# Patient Record
Sex: MALE | Race: WHITE | NOT HISPANIC OR LATINO | Employment: FULL TIME | URBAN - METROPOLITAN AREA
[De-identification: names, ages, dates, MRNs, and addresses within clinical notes are randomized per-mention and may not be internally consistent; named-entity substitution may affect disease eponyms.]

---

## 2020-06-09 ENCOUNTER — TELEPHONE (OUTPATIENT)
Dept: SCHEDULING | Facility: IMAGING CENTER | Age: 55
End: 2020-06-09

## 2020-06-12 ENCOUNTER — TELEMEDICINE (OUTPATIENT)
Dept: MEDICAL GROUP | Facility: IMAGING CENTER | Age: 55
End: 2020-06-12
Payer: COMMERCIAL

## 2020-06-12 VITALS
WEIGHT: 197.5 LBS | DIASTOLIC BLOOD PRESSURE: 98 MMHG | SYSTOLIC BLOOD PRESSURE: 156 MMHG | HEART RATE: 49 BPM | HEIGHT: 69 IN | TEMPERATURE: 98.3 F | BODY MASS INDEX: 29.25 KG/M2

## 2020-06-12 DIAGNOSIS — E03.9 HYPOTHYROIDISM, UNSPECIFIED TYPE: ICD-10-CM

## 2020-06-12 DIAGNOSIS — I10 ESSENTIAL HYPERTENSION: ICD-10-CM

## 2020-06-12 PROCEDURE — 99203 OFFICE O/P NEW LOW 30 MIN: CPT | Performed by: FAMILY MEDICINE

## 2020-06-12 RX ORDER — HYDROCHLOROTHIAZIDE 25 MG/1
25 TABLET ORAL DAILY
Qty: 30 TAB | Refills: 0 | Status: SHIPPED | OUTPATIENT
Start: 2020-06-12 | End: 2020-07-08

## 2020-06-12 RX ORDER — LEVOTHYROXINE SODIUM 0.15 MG/1
150 TABLET ORAL
Qty: 60 TAB | Refills: 0 | Status: SHIPPED | OUTPATIENT
Start: 2020-06-12 | End: 2020-08-14

## 2020-06-12 RX ORDER — LEVOTHYROXINE SODIUM 0.12 MG/1
125 TABLET ORAL
COMMUNITY
Start: 1995-12-20 | End: 2020-06-12

## 2020-06-12 SDOH — HEALTH STABILITY: MENTAL HEALTH: HOW MANY STANDARD DRINKS CONTAINING ALCOHOL DO YOU HAVE ON A TYPICAL DAY?: 5 OR 6

## 2020-06-12 SDOH — HEALTH STABILITY: MENTAL HEALTH: HOW OFTEN DO YOU HAVE A DRINK CONTAINING ALCOHOL?: 4 OR MORE TIMES A WEEK

## 2020-06-12 ASSESSMENT — PATIENT HEALTH QUESTIONNAIRE - PHQ9: CLINICAL INTERPRETATION OF PHQ2 SCORE: 0

## 2020-06-12 NOTE — PROGRESS NOTES
Telemedicine Visit: New Patient     This encounter was conducted via Zoom .   Verbal consent was obtained. Patient's identity was verified. Patient aware this will be   billed the same as an in person evaluation.    Subjective:     Chief Complaint   Patient presents with   • Establish Care   • Hypertension   • Thyroid Problem     Mitch Leigh is a 54 y.o. male with history of essential hyperteion on virtual visit to discuss blood pressure, hypothyroidism, and shortness of breath.  pcp in [place] with [facility] last visit 12/2019    He has been continuously working with his prior PCP to get normal TSH levels.  They are still elevated as of February 2020 at 5.7.  Patient still reports dry skin changes in his hair.  Otherwise he feels well.    Patient reports that he has been having longstanding history of high blood pressure.  It has been as high as 180/100 or more.  Though most of the time it is between 130-150/.  Does from time to time going higher.    Reports sob when he moved to [place] though it did go away. When putting socks on he feels it where he just has to take a couple extra breaths. Not dizzy or faint.   And intensity when working. Reports when he checks his bp it is quite high though when.  Denies sudden vision changes, headaches, shortness of breath chest pain palpitations.    Patient also reports shortness of breath since he moved to [place] just most a year ago.  Ports that the initial shortness of breath improved after couple months a living year.  Though he from time to time still get shortness of breath for example when he bends over to tie his shoes.  Wise he feels well and is active.  He does not have any associated symptoms of dizziness lightheadedness cold sweats chest pains.  Just a couple deep breaths resolves his symptoms.    Long h/o gross hematuria with full workup with urology.      ROS  See HPI  Constitutional: Negative for fever, chills and malaise/fatigue.   HENT: Negative for  "congestion, itchy watery eyes  Eyes: Negative for pain or sudden changes in vision  Respiratory: Negative for cough and wheeze  Cardiovascular: Negative for leg swelling or chest pain  Gastrointestinal: Negative for nausea, vomiting, abdominal pain and diarrhea.   Genitourinary: Negative for dysuria and with chronic gross hematuria.   Skin: Negative for rash or concerning moles   Neurological: Negative for dizziness, focal weakness   Psychiatric/Behavioral: Negative for depression.  With mild anxiety  Musculoskeletal: no weakness or joint stiffness    No Known Allergies    Current medicines (including changes today)  Current Outpatient Medications   Medication Sig Dispense Refill   • levothyroxine (SYNTHROID) 150 MCG Tab Take 1 Tab by mouth Every morning on an empty stomach. 60 Tab 0   • hydroCHLOROthiazide (HYDRODIURIL) 25 MG Tab Take 1 Tab by mouth every day. 30 Tab 0     No current facility-administered medications for this visit.        He  has a past medical history of Gross hematuria, Hypertension, and Thyroid disease.  He  has a past surgical history that includes tonsillectomy and open reduction ([id number]).      Family History   Problem Relation Age of Onset   • Cancer Father         prostate (removed), melanoma (resolved)     No family status information on file.       There are no active problems to display for this patient.         Objective:   Vitals obtained by patient:  /98   Pulse (!) 49   Temp 36.8 °C (98.3 °F)   Ht 1.753 m (5' 9\")   Wt 89.6 kg (197 lb 8 oz)   BMI 29.17 kg/m²     Physical Exam:  Constitutional: Alert, no distress, well-groomed.  Skin: No rashes in visible areas.  Eye: Round. Conjunctiva clear, lids normal. No icterus.   ENMT: Lips pink without lesions, good dentition, moist mucous membranes. Phonation normal.  Neck: No masses, no thyromegaly. Moves freely without pain.  CV: Pulse as reported by patient  Respiratory: Unlabored respiratory effort, no cough or audible " wheeze  Psych: Alert and oriented x3, normal affect and mood.   Neuro: symmetric face. Alert and oriented. Follows commands. No aphasia or dysarthria.    Labs:  No results found for any previous visit.       Imaging:   No results found.    Assessment and Plan:   The following treatment plan was discussed:   Always review package inserts for specific medications discussed how to take Synthroid alone on an empty stomach.  Patient to get TSH and free T4 in 8 weeks.  To return to office in 2 weeks for blood pressure check BMP at that time.  Problem List Items Addressed This Visit     None      Visit Diagnoses     Essential hypertension        Relevant Medications    hydroCHLOROthiazide (HYDRODIURIL) 25 MG Tab    Hypothyroidism, unspecified type        Relevant Medications    levothyroxine (SYNTHROID) 150 MCG Tab    Other Relevant Orders    TSH    FREE THYROXINE        Follow-up: Return in about 2 weeks (around 6/26/2020).        Portions of this note may be dictated using Dragon Naturally[employer] voice recognition software.  Variances in spelling and vocabulary are possible and unintentional.  Not all areas may be caught/corrected.  Please notify me if any discrepancies are noted or if the meaning of any statement is not correct/clear.

## 2020-07-08 ENCOUNTER — TELEPHONE (OUTPATIENT)
Dept: MEDICAL GROUP | Facility: IMAGING CENTER | Age: 55
End: 2020-07-08

## 2020-07-08 DIAGNOSIS — I10 ESSENTIAL HYPERTENSION: ICD-10-CM

## 2020-07-08 RX ORDER — HYDROCHLOROTHIAZIDE 25 MG/1
TABLET ORAL
Qty: 30 TAB | Refills: 0 | Status: SHIPPED | OUTPATIENT
Start: 2020-07-08 | End: 2020-08-17 | Stop reason: SDUPTHER

## 2020-07-10 NOTE — TELEPHONE ENCOUNTER
Called pt to schedule a BP follow up per Dr. Moscoso. Pt refused to schedule until 9 weeks from last visit in June. Pt refused to schedule an appointment sooner to get his labs prior to that follow up. Stated he has kept a log of all the times hes tracked his BP and will send that over [employer] to Dr. Moscoso

## 2020-08-12 ENCOUNTER — HOSPITAL ENCOUNTER (OUTPATIENT)
Dept: LAB | Facility: MEDICAL CENTER | Age: 55
End: 2020-08-12
Attending: FAMILY MEDICINE
Payer: COMMERCIAL

## 2020-08-12 ENCOUNTER — TELEPHONE (OUTPATIENT)
Dept: MEDICAL GROUP | Facility: IMAGING CENTER | Age: 55
End: 2020-08-12

## 2020-08-12 DIAGNOSIS — E03.9 HYPOTHYROIDISM, UNSPECIFIED TYPE: ICD-10-CM

## 2020-08-12 LAB
T4 FREE SERPL-MCNC: 1.54 NG/DL (ref 0.93–1.7)
TSH SERPL DL<=0.005 MIU/L-ACNC: 0.23 UIU/ML (ref 0.38–5.33)

## 2020-08-12 PROCEDURE — 84443 ASSAY THYROID STIM HORMONE: CPT

## 2020-08-12 PROCEDURE — 84439 ASSAY OF FREE THYROXINE: CPT

## 2020-08-12 PROCEDURE — 36415 COLL VENOUS BLD VENIPUNCTURE: CPT

## 2020-08-12 NOTE — TELEPHONE ENCOUNTER
----- Message from Dinora Moscoso M.D. sent at 8/12/2020 12:32 PM PDT -----  Please have patient schedule virtual apt with me. I did review his labs for bp just fyi in his message. Also I need to make adjustments to his thyroid medication. ty

## 2020-08-14 ENCOUNTER — TELEMEDICINE (OUTPATIENT)
Dept: MEDICAL GROUP | Facility: IMAGING CENTER | Age: 55
End: 2020-08-14
Payer: COMMERCIAL

## 2020-08-14 VITALS — BODY MASS INDEX: 29.18 KG/M2 | WEIGHT: 197 LBS | HEIGHT: 69 IN

## 2020-08-14 DIAGNOSIS — E03.9 HYPOTHYROIDISM, UNSPECIFIED TYPE: ICD-10-CM

## 2020-08-14 DIAGNOSIS — M25.512 ACUTE PAIN OF LEFT SHOULDER: ICD-10-CM

## 2020-08-14 DIAGNOSIS — I10 ESSENTIAL HYPERTENSION: ICD-10-CM

## 2020-08-14 PROCEDURE — 99214 OFFICE O/P EST MOD 30 MIN: CPT | Mod: 95,CR | Performed by: FAMILY MEDICINE

## 2020-08-14 RX ORDER — SILDENAFIL 50 MG/1
50 TABLET, FILM COATED ORAL
COMMUNITY

## 2020-08-14 RX ORDER — LEVOTHYROXINE SODIUM 0.1 MG/1
100 TABLET ORAL
COMMUNITY
End: 2020-08-14

## 2020-08-14 RX ORDER — LEVOTHYROXINE SODIUM 0.12 MG/1
125 TABLET ORAL
Qty: 90 TAB | Refills: 0 | Status: SHIPPED | OUTPATIENT
Start: 2020-08-14 | End: 2020-10-14

## 2020-08-14 RX ORDER — SILDENAFIL CITRATE 20 MG/1
TABLET ORAL
COMMUNITY
Start: 2011-06-15 | End: 2020-08-14

## 2020-08-14 RX ORDER — LEVOTHYROXINE SODIUM 0.03 MG/1
12.5 TABLET ORAL
Qty: 45 TAB | Refills: 0 | Status: SHIPPED | OUTPATIENT
Start: 2020-08-14 | End: 2020-10-14

## 2020-08-14 RX ORDER — ALPRAZOLAM 0.25 MG/1
0.25 TABLET ORAL
COMMUNITY
Start: 2002-02-15

## 2020-08-14 NOTE — PROGRESS NOTES
Telemedicine Visit: New Patient     This encounter was conducted via Zoom .   Verbal consent was obtained. Patient's identity was verified. Patient aware this will be   billed the same as an in person evaluation.  Patient in home, I am in office at [address] [place] [address]  Subjective:     Chief Complaint   Patient presents with   • Thyroid Follow-up   • Medication Refill     thyroid and bp medication     Mitch Leigh is a 54 y.o. male with history of hypothy on virtual visit to discuss bp and hypothyroidism. Head rush he had described prior visit is gone.  He is doing well denies palpitation shortness of breath chest pain.  Has been taking his medications as prescribed though he has run out.  His blood pressure has been well controlled there is a chart in messages with a log of all of his blood pressures which have been all well controlled.  No headaches and vision changes or chest pains.  He reports that he hurt his shoulder couple weeks ago.  Some left shoulder it hurts when he lifts weights up over his head.  He has full range of motion of that shoulder.  He wishes to address conservatively.  There is no redness or swelling of the shoulder.    ROS  See HPI  Constitutional: Negative for fever, chills and malaise/fatigue.   HENT: Negative for congestion, itchy watery eyes  Eyes: Negative for pain or sudden changes in vision  Respiratory: Negative for cough and shortness of breath.    Cardiovascular: Negative for leg swelling or chest pain  Gastrointestinal: Negative for nausea, vomiting, abdominal pain and diarrhea.   Genitourinary: Negative for dysuria and hematuria.   Skin: Negative for rash or concerning moles   Neurological: Negative for dizziness, focal weakness   Psychiatric/Behavioral: Negative for depression.  The patient is not nervous/anxious.    Musculoskeletal: no weakness or joint stiffness    No Known Allergies    Current medicines (including changes today)  Current Outpatient  "Medications   Medication Sig Dispense Refill   • ALPRAZolam (XANAX) 0.25 MG Tab Take 0.25 mg by mouth.     • sildenafil citrate (VIAGRA) 50 MG tablet Take 50 mg by mouth.     • Thyroid 97.5 MG Tab Take  by mouth.     • levothyroxine (SYNTHROID) 100 MCG Tab Take 100 mcg by mouth.     • hydroCHLOROthiazide (HYDRODIURIL) 25 MG Tab TAKE 1 TABLET BY MOUTH EVERY DAY 30 Tab 0   • levothyroxine (SYNTHROID) 150 MCG Tab Take 1 Tab by mouth Every morning on an empty stomach. 60 Tab 0   • sildenafil (REVATIO) 20 MG tablet        No current facility-administered medications for this visit.        He  has a past medical history of Gross hematuria, Hypertension, and Thyroid disease.  He  has a past surgical history that includes tonsillectomy and open reduction ([id number]).      Family History   Problem Relation Age of Onset   • Cancer Father         prostate (removed), melanoma (resolved)     Family Status   Relation Name Status   • Fa  (Not Specified)       There are no active problems to display for this patient.         Objective:   Vitals obtained by patient:  Ht 1.753 m (5' 9\")   Wt 89.4 kg (197 lb)   BMI 29.09 kg/m²     Physical Exam:  Constitutional: Alert, no distress, well-groomed.  Skin: No rashes in visible areas.  Eye: Round. Conjunctiva clear, lids normal. No icterus.   ENMT: Lips pink without lesions, good dentition, moist mucous membranes. Phonation normal.  Neck: No masses, no thyromegaly. Moves freely without pain.  CV: Pulse as reported by patient  Respiratory: Unlabored respiratory effort, no cough or audible wheeze  Psych: Alert and oriented x3, normal affect and mood.   Neuro: symmetric face. Alert and oriented. Follows commands. No aphasia or dysarthria.    Labs:  Hospital Outpatient Visit on 08/12/2020   Component Date Value Ref Range Status   • Free T-4 08/12/2020 1.54  0.93 - 1.70 ng/dL Final    Please note new FT4 reference range effective 4/29/2020.   • TSH 08/12/2020 0.234* 0.380 - 5.330 uIU/mL Final "    Comment: Please note new reference ranges effective 12/14/2017 10:00 AM  Pregnant Females, 1st Trimester  0.050-3.700  Pregnant Females, 2nd Trimester  0.310-4.350  Pregnant Females, 3rd Trimester  0.410-5.180         Imaging:   No results found.    Assessment and Plan:   The following treatment plan was discussed:   Patient to obtain labs in 6 weeks follow-up with me in 8 weeks  He will be taking 1 tab of the 125 mcg levothyroxine +1/2 tab of 25 doing 137.5 g daily  He was on 125 when I met him and TSH was high we went up to 159 his TSH is too low.  So I will adjust with 12.5 mcg instead of the usual 25 mcg for him as needed.  Problem List Items Addressed This Visit     None      Visit Diagnoses     Hypothyroidism, unspecified type        Relevant Medications    levothyroxine (SYNTHROID) 125 MCG Tab    levothyroxine (SYNTHROID) 25 MCG Tab    Other Relevant Orders    TSH WITH REFLEX TO FT4    Essential hypertension        Relevant Medications    sildenafil citrate (VIAGRA) 50 MG tablet    Other Relevant Orders    Comp Metabolic Panel    Acute pain of left shoulder        Relevant Orders    REFERRAL TO PHYSICAL THERAPY Reason for Therapy: Eval/Treat/Report      Return to office if physical therapy does not improve shoulder pain  Follow-up: Return in about 2 months (around 10/14/2020).        Portions of this note may be dictated using Dragon NaturallySpeaking voice recognition software.  Variances in spelling and vocabulary are possible and unintentional.  Not all areas may be caught/corrected.  Please notify me if any discrepancies are noted or if the meaning of any statement is not correct/clear.

## 2020-09-01 ENCOUNTER — PHYSICAL THERAPY (OUTPATIENT)
Dept: PHYSICAL THERAPY | Facility: REHABILITATION | Age: 55
End: 2020-09-01
Attending: FAMILY MEDICINE
Payer: COMMERCIAL

## 2020-09-01 DIAGNOSIS — M25.512 ACUTE PAIN OF LEFT SHOULDER: ICD-10-CM

## 2020-09-01 PROCEDURE — 97110 THERAPEUTIC EXERCISES: CPT

## 2020-09-01 PROCEDURE — 97162 PT EVAL MOD COMPLEX 30 MIN: CPT

## 2020-09-01 PROCEDURE — 97535 SELF CARE MNGMENT TRAINING: CPT

## 2020-09-01 ASSESSMENT — ENCOUNTER SYMPTOMS
EXACERBATED BY: LIFTING
EXACERBATED BY: OVERHEAD ACTIVITY
PAIN LOCATION: POSTERIOR LEFT SHOULDER
PAIN SCALE: 0
PAIN SCALE AT HIGHEST: 8
PAIN SCALE AT LOWEST: 0

## 2020-09-16 ENCOUNTER — PHYSICAL THERAPY (OUTPATIENT)
Dept: PHYSICAL THERAPY | Facility: REHABILITATION | Age: 55
End: 2020-09-16
Attending: FAMILY MEDICINE
Payer: COMMERCIAL

## 2020-09-16 DIAGNOSIS — M25.512 ACUTE PAIN OF LEFT SHOULDER: ICD-10-CM

## 2020-09-16 PROCEDURE — 97110 THERAPEUTIC EXERCISES: CPT

## 2020-09-16 NOTE — OP THERAPY DAILY TREATMENT
Outpatient Physical Therapy  DAILY TREATMENT     [facility] Outpatient Physical Therapy [address], [address]  [place] [address]  Phone:  [phone]    Date: 09/16/2020    Patient: Mitch Leigh  YOB: 1965  MRN: [id number]     Time Calculation    Start time: 0900  Stop time: 0920 Time Calculation (min): 20 minutes         Chief Complaint: Shoulder Problem    Visit #: 2    SUBJECTIVE:  Not having sharp pain in shoulder any longer. My shoulder feeling much better but have not resumed my normal exercises or swimming, preferred style is breaststroke.     OBJECTIVE:  Current objective measures:     Sleeper stretch effective in improving BTB IR. All other shoulder AROM normal.    5/5 sh. External rotation and internal rotation strength  5/5 theresa-scapular strength   5/5 flexion, abduction, and horizontal add strength no pain           Therapeutic Exercises (CPT 00974):     1. Sleeper stretch     2. MMT     3. Strength assessment     4. Assessment for mobility and strength and motor control for swimming    Therapeutic Treatments and Modalities:     1. Functional Training, Self Care (CPT 33106), HEP guidence     Time-based treatments/modalities:    Physical Therapy Timed Treatment Charges  Functional training, self care minutes (CPT 91105): 5 minutes  Therapeutic exercise minutes (CPT 07451): 15 minutes      Pain rating (1-10) before treatment:  0  Pain rating (1-10) after treatment:  0    ASSESSMENT:   Response to treatment: Patient has made good progress with normal sh. AROM and strength. He had some limitation with sh. Internal rotation mobility but that was improved to normal after stretching. He was taken through assessment for readiness to return to swimming and passed. Patient will increase activity level and return in 2 weeks or earlier if symptoms develop.     PLAN/RECOMMENDATIONS:   Plan for treatment: therapy treatment to continue next visit.  Planned interventions for next visit:  continue with current treatment.

## 2020-09-25 ENCOUNTER — PHYSICAL THERAPY (OUTPATIENT)
Dept: PHYSICAL THERAPY | Facility: REHABILITATION | Age: 55
End: 2020-09-25
Attending: FAMILY MEDICINE
Payer: COMMERCIAL

## 2020-09-25 DIAGNOSIS — M25.512 ACUTE PAIN OF LEFT SHOULDER: ICD-10-CM

## 2020-09-25 PROCEDURE — 97535 SELF CARE MNGMENT TRAINING: CPT

## 2020-09-25 PROCEDURE — 97110 THERAPEUTIC EXERCISES: CPT

## 2020-09-25 NOTE — OP THERAPY DAILY TREATMENT
"  Outpatient Physical Therapy  DAILY TREATMENT     [facility] [address] [facility], [address]  [place] [address]  Phone:  [phone]    Date: 09/25/2020    Patient: Mitch Leigh  YOB: 1965  MRN: [id number]     Time Calculation    Start time: 0930  Stop time: 1000 Time Calculation (min): 30 minutes         Chief Complaint: Shoulder Problem    Visit #: 3    SUBJECTIVE:  The only issue I am having is reaching behind the back. Have not resumed swimming due to lack of availability. If I stretch I can reach behind back, but I don't want to need to stretch to be able to dress.     OBJECTIVE:  Current objective measures:     Starting BTB IR to sacrum//BTB IR to T11 no pain    90-90 ER starting at 0 w/ complaint of posterior shoulder \"pain\" mild in intensity      Therapeutic Exercises (CPT 92392):     1. Sleeper stretch    2. 90-90 ER , progressed from 4# DB to pink band    3. 90-90 IR, Pink stretch     4. MWM standing IR w/ purple band as stabilization of GHJ anterior       Time-based treatments/modalities:    Physical Therapy Timed Treatment Charges  Functional training, self care minutes (CPT 83820): 10 minutes  Therapeutic exercise minutes (CPT 75642): 20 minutes        ASSESSMENT:   Response to treatment: Patient was performing sleeper stretch incorrectly, letting humerus loose it's position once starting stretch. When performed correctly it was effective in improving shoulder IR mobility. He was progressed for RC strengthening for ext rotation in 90-90 position and was shown a 90-90 ER antagonist contract stretch per his request, his mobility was already normal at start of session.     PLAN/RECOMMENDATIONS:   Plan for treatment: therapy treatment to continue next visit.  Planned interventions for next visit: continue with current treatment.       "

## 2020-10-13 DIAGNOSIS — E03.9 HYPOTHYROIDISM, UNSPECIFIED TYPE: ICD-10-CM

## 2020-10-14 ENCOUNTER — NURSE TRIAGE (OUTPATIENT)
Dept: HEALTH INFORMATION MANAGEMENT | Facility: OTHER | Age: 55
End: 2020-10-14

## 2020-10-14 ENCOUNTER — HOSPITAL ENCOUNTER (OUTPATIENT)
Dept: LAB | Facility: MEDICAL CENTER | Age: 55
End: 2020-10-14
Attending: FAMILY MEDICINE
Payer: COMMERCIAL

## 2020-10-14 DIAGNOSIS — Z20.822 SUSPECTED COVID-19 VIRUS INFECTION: ICD-10-CM

## 2020-10-14 DIAGNOSIS — I10 ESSENTIAL HYPERTENSION: ICD-10-CM

## 2020-10-14 PROCEDURE — U0003 INFECTIOUS AGENT DETECTION BY NUCLEIC ACID (DNA OR RNA); SEVERE ACUTE RESPIRATORY SYNDROME CORONAVIRUS 2 (SARS-COV-2) (CORONAVIRUS DISEASE [COVID-19]), AMPLIFIED PROBE TECHNIQUE, MAKING USE OF HIGH THROUGHPUT TECHNOLOGIES AS DESCRIBED BY CMS-2020-01-R: HCPCS

## 2020-10-14 PROCEDURE — C9803 HOPD COVID-19 SPEC COLLECT: HCPCS

## 2020-10-14 RX ORDER — LEVOTHYROXINE SODIUM 0.12 MG/1
TABLET ORAL
Qty: 90 TAB | Refills: 0 | Status: SHIPPED | OUTPATIENT
Start: 2020-10-14 | End: 2020-11-13

## 2020-10-14 RX ORDER — LEVOTHYROXINE SODIUM 0.03 MG/1
TABLET ORAL
Qty: 45 TAB | Refills: 0 | Status: SHIPPED | OUTPATIENT
Start: 2020-10-14 | End: 2020-11-13

## 2020-10-14 RX ORDER — HYDROCHLOROTHIAZIDE 25 MG/1
25 TABLET ORAL
Qty: 90 TAB | Refills: 1 | Status: SHIPPED | OUTPATIENT
Start: 2020-10-14 | End: 2021-07-07

## 2020-10-14 NOTE — TELEPHONE ENCOUNTER
1. Caller Name: Mitch Leigh                 Call Back Number: [phone]  [facility] PCP or Specialty Provider: Yes         2.  In the last two weeks, has the patient had any new or worsening symptoms (not explained by alternative diagnosis)? Yes, the patient reports the following COVID-19 consistent symptoms: cough, shortness of breath or difficulty breathing and chills, congestion, body aches, fatigue, HA, Started on 10/11. Fever started this morning, 100.3.       Had a morning cough for months, clears up by mid-morning, attributes to allergies, this cough is different in that it does not go away, it feels like he is clearing something when he coughs.      3.  Does patient have any comoribidities? Hypothyroidism, HTN,     4.  Has the patient traveled in the last 14 days OR had any known contact with someone who is suspected or confirmed to have COVID-19?  No.  Two people in home recently but not sick.      5. Disposition: Sending request for covid test to pcp.    Note routed to [facility] Provider: Provider action needed: please place order for covid test in pat's chart.

## 2020-10-14 NOTE — TELEPHONE ENCOUNTER
Regarding: chills , fever and cough .  ----- Message from Opal Whitfield sent at 10/14/2020  9:31 AM PDT -----  Patient is experiencing chills, a persistent cough and fever

## 2020-10-15 LAB
COVID ORDER STATUS COVID19: NORMAL
SARS-COV-2 RNA RESP QL NAA+PROBE: NOTDETECTED
SPECIMEN SOURCE: NORMAL

## 2020-10-16 DIAGNOSIS — R06.02 SHORTNESS OF BREATH: ICD-10-CM

## 2020-10-16 DIAGNOSIS — R50.9 FEVER, UNSPECIFIED FEVER CAUSE: ICD-10-CM

## 2020-11-23 ENCOUNTER — HOSPITAL ENCOUNTER (OUTPATIENT)
Dept: LAB | Facility: MEDICAL CENTER | Age: 55
End: 2020-11-23
Attending: FAMILY MEDICINE
Payer: COMMERCIAL

## 2020-11-23 DIAGNOSIS — I10 ESSENTIAL HYPERTENSION: ICD-10-CM

## 2020-11-23 DIAGNOSIS — E03.9 HYPOTHYROIDISM, UNSPECIFIED TYPE: ICD-10-CM

## 2020-11-23 LAB
ALBUMIN SERPL BCP-MCNC: 4.8 G/DL (ref 3.2–4.9)
ALBUMIN/GLOB SERPL: 2.1 G/DL
ALP SERPL-CCNC: 45 U/L (ref 30–99)
ALT SERPL-CCNC: 34 U/L (ref 2–50)
ANION GAP SERPL CALC-SCNC: 9 MMOL/L (ref 7–16)
AST SERPL-CCNC: 24 U/L (ref 12–45)
BILIRUB SERPL-MCNC: 0.6 MG/DL (ref 0.1–1.5)
BUN SERPL-MCNC: 14 MG/DL (ref 8–22)
CALCIUM SERPL-MCNC: 9.8 MG/DL (ref 8.5–10.5)
CHLORIDE SERPL-SCNC: 99 MMOL/L (ref 96–112)
CO2 SERPL-SCNC: 29 MMOL/L (ref 20–33)
CREAT SERPL-MCNC: 0.8 MG/DL (ref 0.5–1.4)
GLOBULIN SER CALC-MCNC: 2.3 G/DL (ref 1.9–3.5)
GLUCOSE SERPL-MCNC: 95 MG/DL (ref 65–99)
POTASSIUM SERPL-SCNC: 3.9 MMOL/L (ref 3.6–5.5)
PROT SERPL-MCNC: 7.1 G/DL (ref 6–8.2)
SODIUM SERPL-SCNC: 137 MMOL/L (ref 135–145)
TSH SERPL DL<=0.005 MIU/L-ACNC: 1.71 UIU/ML (ref 0.38–5.33)

## 2020-11-23 PROCEDURE — 36415 COLL VENOUS BLD VENIPUNCTURE: CPT

## 2020-11-23 PROCEDURE — 84443 ASSAY THYROID STIM HORMONE: CPT

## 2020-11-23 PROCEDURE — 80053 COMPREHEN METABOLIC PANEL: CPT

## 2021-02-17 ENCOUNTER — TELEPHONE (OUTPATIENT)
Dept: PHYSICAL THERAPY | Facility: REHABILITATION | Age: 56
End: 2021-02-17

## 2021-02-17 NOTE — OP THERAPY DISCHARGE SUMMARY
Outpatient Physical Therapy  DISCHARGE SUMMARY NOTE      [address]  [address]  [place] [address]  Phone:  [phone]  Fax:  [phone]    Date of Visit: 02/17/2021    Patient: Mitch Leigh  YOB: 1965  MRN: [id number]     Referring Provider: No referring provider defined for this encounter.   Referring Diagnosis No admission diagnoses are documented for this encounter.         Functional Assessment Used        Your patient is being discharged from Physical Therapy with the following comments:   · Other    Comments:  Patient was previously seen in physical therapy with last visit on 9/25/2020. Patient's DC summary not written at that time. Treating provider is no longer with organization.       Teddy Griffith, PT, DPT    Date: 2/17/2021

## 2021-03-15 DIAGNOSIS — Z23 NEED FOR VACCINATION: ICD-10-CM

## 2021-04-05 ENCOUNTER — OFFICE VISIT (OUTPATIENT)
Dept: MEDICAL GROUP | Facility: IMAGING CENTER | Age: 56
End: 2021-04-05
Payer: COMMERCIAL

## 2021-04-05 VITALS
RESPIRATION RATE: 12 BRPM | WEIGHT: 196.8 LBS | HEIGHT: 69 IN | DIASTOLIC BLOOD PRESSURE: 78 MMHG | TEMPERATURE: 97.6 F | SYSTOLIC BLOOD PRESSURE: 122 MMHG | OXYGEN SATURATION: 96 % | BODY MASS INDEX: 29.15 KG/M2 | HEART RATE: 70 BPM

## 2021-04-05 DIAGNOSIS — E03.9 HYPOTHYROIDISM, UNSPECIFIED TYPE: ICD-10-CM

## 2021-04-05 DIAGNOSIS — R05.3 CHRONIC COUGH: ICD-10-CM

## 2021-04-05 DIAGNOSIS — R13.10 DYSPHAGIA, UNSPECIFIED TYPE: ICD-10-CM

## 2021-04-05 PROCEDURE — 99214 OFFICE O/P EST MOD 30 MIN: CPT | Performed by: FAMILY MEDICINE

## 2021-04-05 RX ORDER — FLUTICASONE PROPIONATE 220 UG/1
1 AEROSOL, METERED RESPIRATORY (INHALATION) 2 TIMES DAILY
Qty: 12 G | Refills: 3 | Status: SHIPPED | OUTPATIENT
Start: 2021-04-05

## 2021-04-05 RX ORDER — ALBUTEROL SULFATE 90 UG/1
2 AEROSOL, METERED RESPIRATORY (INHALATION) EVERY 4 HOURS PRN
Qty: 1 EACH | Refills: 0 | Status: SHIPPED | OUTPATIENT
Start: 2021-04-05

## 2021-04-05 ASSESSMENT — ENCOUNTER SYMPTOMS
COUGH: 1
MYALGIAS: 0
ABDOMINAL PAIN: 0
EYE PAIN: 0
FEVER: 0
WHEEZING: 0
DIZZINESS: 0
DIARRHEA: 0
SHORTNESS OF BREATH: 1
NAUSEA: 0
CHILLS: 0
HEADACHES: 0
VOMITING: 0
DOUBLE VISION: 0
PALPITATIONS: 0

## 2021-04-05 ASSESSMENT — ANXIETY QUESTIONNAIRES
7. FEELING AFRAID AS IF SOMETHING AWFUL MIGHT HAPPEN: NOT AT ALL
4. TROUBLE RELAXING: NOT AT ALL
2. NOT BEING ABLE TO STOP OR CONTROL WORRYING: NOT AT ALL
5. BEING SO RESTLESS THAT IT IS HARD TO SIT STILL: NOT AT ALL
3. WORRYING TOO MUCH ABOUT DIFFERENT THINGS: NOT AT ALL
GAD7 TOTAL SCORE: 1
6. BECOMING EASILY ANNOYED OR IRRITABLE: SEVERAL DAYS
1. FEELING NERVOUS, ANXIOUS, OR ON EDGE: NOT AT ALL

## 2021-04-05 ASSESSMENT — PATIENT HEALTH QUESTIONNAIRE - PHQ9
CLINICAL INTERPRETATION OF PHQ2 SCORE: 2
SUM OF ALL RESPONSES TO PHQ QUESTIONS 1-9: 3
5. POOR APPETITE OR OVEREATING: 0 - NOT AT ALL

## 2021-04-05 ASSESSMENT — PAIN SCALES - GENERAL: PAINLEVEL: NO PAIN

## 2021-04-05 NOTE — PROGRESS NOTES
Chief Complaint   Patient presents with   • Cough     concern for asthma; coughing fits - x months    • Hypothyroidism     HPI:  55-year-old male with a history of hypothyroidism and hypertension here with concerns about cough.    Cough fits last about a day. covid tested negative 3 times. Some shortness of breath. Can walk briskley without issue many blocks. Has been at this altitue for 4 years. Has been on inhaler before which helped.  Denies wheeze,  heart burn, association with fatty heavy meals, exercise.  Appears to be associated with allergies.  Has difficulty swallowing. Has seen ent for this in the past. Does admit to taking a shot which really helps with this.  Otherwise he cannot eat his food.  The dentist did notice and abnormality in the palate and patient reports family history of this. Grandfather also drank to help with swallowing as well. Thought is was psychological due to chocking on popcorn as a child and everyone thought he was joking.  Reports this was traumatic for him.  Did have 48 hour oxygen test that was negative in 2004. Patient does report waking up in the middle of the night gasping. He has a difficult time falling asleep due to feeling obstruction in the airway when he lays down.     Patient was taking 150 mcg of levothyroxine daily. Decreased to 137.5mg and repeat tsh was normal. Reports he did feel better on natural thyorid. Feels that his male pattern baldness could be associated with the switch to synthetic thyroid medication.     No Known Allergies  Current Outpatient Medications   Medication Sig Dispense Refill   • fluticasone (FLOVENT HFA) 220 MCG/ACT Aerosol Inhale 1 Puff 2 times a day. 12 g 3   • albuterol 108 (90 Base) MCG/ACT Aero Soln inhalation aerosol Inhale 2 Puffs every four hours as needed for Shortness of Breath. 1 Each 0   • levothyroxine (SYNTHROID) 125 MCG Tab TAKE 1 TABLET BY MOUTH EVERY MORNING ON AN EMPTY STOMACH 90 Tab 3   • levothyroxine (SYNTHROID) 25 MCG Tab  "TAKE 1/2 TABLET BY MOUTH EVERY MORNING ON AN EMPTY STOMACH 45 Tab 3   • hydroCHLOROthiazide (HYDRODIURIL) 25 MG Tab Take 1 Tab by mouth every day. 90 Tab 1   • ALPRAZolam (XANAX) 0.25 MG Tab Take 0.25 mg by mouth.     • sildenafil citrate (VIAGRA) 50 MG tablet Take 50 mg by mouth.       No current facility-administered medications for this visit.     Social History     Tobacco Use   • Smoking status: Former Smoker   • Smokeless tobacco: Never Used   Substance Use Topics   • Alcohol use: Yes     Alcohol/week: 8.4 oz     Types: 14 Standard drinks or equivalent per week   • Drug use: Not Currently     Comment: previous marijuana     Family History   Problem Relation Age of Onset   • Cancer Father         prostate (removed), melanoma (resolved)       /78   Pulse 70   Temp 36.4 °C (97.6 °F)   Resp 12   Ht 1.753 m (5' 9\")   Wt 89.3 kg (196 lb 12.8 oz)   SpO2 96%  Body mass index is 29.06 kg/m².  Review of Systems   Constitutional: Negative for chills, fever and malaise/fatigue.   HENT: Negative for hearing loss and tinnitus.    Eyes: Negative for double vision and pain.   Respiratory: Positive for cough and shortness of breath. Negative for wheezing.    Cardiovascular: Negative for chest pain, palpitations and leg swelling.   Gastrointestinal: Negative for abdominal pain, diarrhea, nausea and vomiting.   Genitourinary: Negative for dysuria and frequency.   Musculoskeletal: Negative for joint pain and myalgias.   Skin: Negative for itching and rash.   Neurological: Negative for dizziness and headaches.     Physical Exam   Constitutional: He is oriented to person, place, and time and well-developed, well-nourished, and in no distress. No distress.   HENT:   Head: Normocephalic and atraumatic.   Eyes: Pupils are equal, round, and reactive to light. Conjunctivae are normal. Right eye exhibits no discharge. Left eye exhibits no discharge. No scleral icterus.   Cardiovascular: Normal rate, regular rhythm and normal " heart sounds. Exam reveals no gallop and no friction rub.   No murmur heard.  Pulmonary/Chest: Effort normal and breath sounds normal. No respiratory distress. He has no wheezes. He has no rales.   Musculoskeletal:         General: No edema.   Neurological: He is alert and oriented to person, place, and time.   Skin: Skin is warm and dry. He is not diaphoretic.         All labs (last 1 month):   No visits with results within 1 Month(s) from this visit.   Latest known visit with results is:   Hospital Outpatient Visit on 11/23/2020   Component Date Value Ref Range Status   • Sodium 11/23/2020 137  135 - 145 mmol/L Final   • Potassium 11/23/2020 3.9  3.6 - 5.5 mmol/L Final   • Chloride 11/23/2020 99  96 - 112 mmol/L Final   • Co2 11/23/2020 29  20 - 33 mmol/L Final   • Anion Gap 11/23/2020 9.0  7.0 - 16.0 Final   • Glucose 11/23/2020 95  65 - 99 mg/dL Final   • Bun 11/23/2020 14  8 - 22 mg/dL Final   • Creatinine 11/23/2020 0.80  0.50 - 1.40 mg/dL Final   • Calcium 11/23/2020 9.8  8.5 - 10.5 mg/dL Final   • AST(SGOT) 11/23/2020 24  12 - 45 U/L Final   • ALT(SGPT) 11/23/2020 34  2 - 50 U/L Final   • Alkaline Phosphatase 11/23/2020 45  30 - 99 U/L Final   • Total Bilirubin 11/23/2020 0.6  0.1 - 1.5 mg/dL Final   • Albumin 11/23/2020 4.8  3.2 - 4.9 g/dL Final   • Total Protein 11/23/2020 7.1  6.0 - 8.2 g/dL Final   • Globulin 11/23/2020 2.3  1.9 - 3.5 g/dL Final   • A-G Ratio 11/23/2020 2.1  g/dL Final   • TSH 11/23/2020 1.710  0.380 - 5.330 uIU/mL Final    Comment: Please note new reference ranges effective 12/14/2017 10:00 AM  Pregnant Females, 1st Trimester  0.050-3.700  Pregnant Females, 2nd Trimester  0.310-4.350  Pregnant Females, 3rd Trimester  0.410-5.180     • GFR If  11/23/2020 >60  >60 mL/min/1.73 m 2 Final   • GFR If Non  11/23/2020 >60  >60 mL/min/1.73 m 2 Final       Lipids: No results found for: CHOLSTRLTOT, TRIGLYCERIDE, HDL, LDL    Imaging: No results  found.    A/P  Encouraged to follow-up with dentist.  May need oral facial surgeon.  May also need GI referral. Will send. So that if still needed after dentist intervention he can schedule. Will send for home sleep apnea test to evaluate his night time apnea. Patient to follow up after results if positive or if continued symptoms.   Return cough after workup.    Problem List Items Addressed This Visit     None      Visit Diagnoses     Hypothyroidism, unspecified type        Relevant Orders    TSH WITH REFLEX TO FT4    Chronic cough        Relevant Medications    fluticasone (FLOVENT HFA) 220 MCG/ACT Aerosol    albuterol 108 (90 Base) MCG/ACT Aero Soln inhalation aerosol    Other Relevant Orders    PULMONARY FUNCTION TESTS -Test requested: Complete Pulmonary Function Test; Include MIPS/MEPS? No    Dysphagia, unspecified type        Relevant Orders    REFERRAL TO GASTROENTEROLOGY    REFERRAL TO ORAL MAXILLOFACIAL SURGERY          Portions of this note may be dictated using Dragon NaturallySpeaNoveporter voice recognition software.  Variances in spelling and vocabulary are possible and unintentional.  Not all areas may be caught/corrected.  Please notify me if any discrepancies are noted or if the meaning of any statement is not correct/clear.

## 2021-05-19 ENCOUNTER — APPOINTMENT (OUTPATIENT)
Dept: PULMONOLOGY | Facility: MEDICAL CENTER | Age: 56
End: 2021-05-19
Payer: COMMERCIAL

## 2021-12-09 ENCOUNTER — PATIENT MESSAGE (OUTPATIENT)
Dept: MEDICAL GROUP | Facility: IMAGING CENTER | Age: 56
End: 2021-12-09

## 2021-12-09 DIAGNOSIS — E03.9 HYPOTHYROIDISM, UNSPECIFIED TYPE: ICD-10-CM

## 2021-12-10 RX ORDER — LEVOTHYROXINE SODIUM 0.12 MG/1
125 TABLET ORAL
Qty: 90 TABLET | Refills: 1 | Status: SHIPPED | OUTPATIENT
Start: 2021-12-10 | End: 2022-06-08

## 2021-12-10 RX ORDER — LEVOTHYROXINE SODIUM 0.03 MG/1
TABLET ORAL
Qty: 45 TABLET | Refills: 1 | Status: SHIPPED | OUTPATIENT
Start: 2021-12-10